# Patient Record
Sex: FEMALE | URBAN - METROPOLITAN AREA
[De-identification: names, ages, dates, MRNs, and addresses within clinical notes are randomized per-mention and may not be internally consistent; named-entity substitution may affect disease eponyms.]

---

## 2022-08-05 ENCOUNTER — NURSE TRIAGE (OUTPATIENT)
Dept: NURSING | Facility: CLINIC | Age: 27
End: 2022-08-05

## 2022-08-05 NOTE — TELEPHONE ENCOUNTER
Questions and concerns about getting in for pap smear. Itching, vaginal swelling. She will call the clinic when they open at 7:30 a.m. today. I advised urgent care if they can't get her into the clinic.  Мария Gonzalez RN  [facility]      Reason for Disposition    MODERATE-SEVERE itching (i.e., interferes with school, work, or sleep)     Rates it at 4.    Additional Information    Negative: Sounds like a life-threatening emergency to the triager    Negative: Followed a genital area injury (e.g., vagina, vulva)    Negative: Foreign body in vagina (e.g., tampon)    Negative: Vaginal bleeding is main symptom    Negative: Vaginal discharge is main symptom    Negative: Pain or burning with passing urine (urination) is main symptom    Negative: Menstrual cramps is main symptom    Negative: Abdomen pain is main symptom    Negative: Pubic lice suspected    Negative: Itching or rash of external female genital area (vulva)    Negative: Labor suspected    Negative: Patient sounds very sick or weak to the triager    Negative: [1] SEVERE pain AND [2] not improved 2 hours after pain medicine    Negative: [1] Genital area looks infected (e.g., draining sore, spreading redness) AND [2] fever     Itching and swelling.    Negative: [1] Something is hanging out of the vagina AND [2] can't easily be pushed back inside    Protocols used: VAGINAL SYMPTOMS-A-AH